# Patient Record
Sex: MALE | Employment: UNEMPLOYED | URBAN - METROPOLITAN AREA
[De-identification: names, ages, dates, MRNs, and addresses within clinical notes are randomized per-mention and may not be internally consistent; named-entity substitution may affect disease eponyms.]

---

## 2024-01-20 ENCOUNTER — HOSPITAL ENCOUNTER (EMERGENCY)
Age: 54
Discharge: HOME | End: 2024-01-20
Attending: EMERGENCY MEDICINE

## 2024-01-20 ENCOUNTER — APPOINTMENT (OUTPATIENT)
Dept: ULTRASOUND IMAGING | Age: 54
End: 2024-01-20

## 2024-01-20 VITALS
RESPIRATION RATE: 18 BRPM | SYSTOLIC BLOOD PRESSURE: 192 MMHG | TEMPERATURE: 98.7 F | DIASTOLIC BLOOD PRESSURE: 106 MMHG | HEIGHT: 67 IN | WEIGHT: 203 LBS | BODY MASS INDEX: 31.86 KG/M2 | HEART RATE: 88 BPM | OXYGEN SATURATION: 97 %

## 2024-01-20 DIAGNOSIS — N45.3 EPIDIDYMOORCHITIS: ICD-10-CM

## 2024-01-20 DIAGNOSIS — N50.812 TESTICULAR PAIN, LEFT: Primary | ICD-10-CM

## 2024-01-20 DIAGNOSIS — R03.0 ELEVATED BLOOD PRESSURE READING: ICD-10-CM

## 2024-01-20 LAB
BACTERIA URNS QL MICRO: ABNORMAL /HPF
BILIRUB UR QL STRIP.AUTO: NEGATIVE
CLARITY UR: ABNORMAL
COLOR UR: YELLOW
EPI CELLS #/AREA URNS HPF: ABNORMAL /HPF (ref 0–5)
GLUCOSE UR STRIP.AUTO-MCNC: NEGATIVE MG/DL
HGB UR QL STRIP.AUTO: ABNORMAL
KETONES UR STRIP.AUTO-MCNC: NEGATIVE MG/DL
LEUKOCYTE ESTERASE UR QL STRIP.AUTO: ABNORMAL
NITRITE UR QL STRIP.AUTO: NEGATIVE
PH UR STRIP.AUTO: 6.5 [PH] (ref 5–8)
PROT UR STRIP.AUTO-MCNC: ABNORMAL MG/DL
RBC #/AREA URNS HPF: ABNORMAL /HPF (ref 0–4)
SP GR UR STRIP.AUTO: 1.02 (ref 1–1.03)
UA COMPLETE W REFLEX CULTURE PNL UR: YES
UA DIPSTICK W REFLEX MICRO PNL UR: YES
URN SPEC COLLECT METH UR: ABNORMAL
UROBILINOGEN UR STRIP-ACNC: 0.2 E.U./DL
WBC #/AREA URNS HPF: >100 /HPF (ref 0–5)

## 2024-01-20 PROCEDURE — 6370000000 HC RX 637 (ALT 250 FOR IP): Performed by: EMERGENCY MEDICINE

## 2024-01-20 PROCEDURE — 81001 URINALYSIS AUTO W/SCOPE: CPT

## 2024-01-20 PROCEDURE — 87491 CHLMYD TRACH DNA AMP PROBE: CPT

## 2024-01-20 PROCEDURE — 76870 US EXAM SCROTUM: CPT

## 2024-01-20 PROCEDURE — 99283 EMERGENCY DEPT VISIT LOW MDM: CPT

## 2024-01-20 PROCEDURE — 87591 N.GONORRHOEAE DNA AMP PROB: CPT

## 2024-01-20 PROCEDURE — 87086 URINE CULTURE/COLONY COUNT: CPT

## 2024-01-20 RX ORDER — LEVOFLOXACIN 500 MG/1
500 TABLET, FILM COATED ORAL ONCE
Status: COMPLETED | OUTPATIENT
Start: 2024-01-20 | End: 2024-01-20

## 2024-01-20 RX ORDER — LEVOFLOXACIN 500 MG/1
500 TABLET, FILM COATED ORAL DAILY
Qty: 10 TABLET | Refills: 0 | Status: SHIPPED | OUTPATIENT
Start: 2024-01-20 | End: 2024-01-30

## 2024-01-20 RX ORDER — IBUPROFEN 600 MG/1
600 TABLET ORAL ONCE
Status: COMPLETED | OUTPATIENT
Start: 2024-01-20 | End: 2024-01-20

## 2024-01-20 RX ADMIN — IBUPROFEN 600 MG: 600 TABLET, FILM COATED ORAL at 17:47

## 2024-01-20 RX ADMIN — LEVOFLOXACIN 500 MG: 500 TABLET, FILM COATED ORAL at 17:47

## 2024-01-20 ASSESSMENT — PAIN DESCRIPTION - DESCRIPTORS
DESCRIPTORS: DISCOMFORT
DESCRIPTORS: DISCOMFORT

## 2024-01-20 ASSESSMENT — PAIN DESCRIPTION - PAIN TYPE: TYPE: ACUTE PAIN

## 2024-01-20 ASSESSMENT — PAIN - FUNCTIONAL ASSESSMENT: PAIN_FUNCTIONAL_ASSESSMENT: 0-10

## 2024-01-20 ASSESSMENT — ENCOUNTER SYMPTOMS
SHORTNESS OF BREATH: 0
BACK PAIN: 0
COUGH: 0
RHINORRHEA: 0
EYE PAIN: 0
NAUSEA: 0
DIARRHEA: 0
CONSTIPATION: 0
ABDOMINAL PAIN: 0
VOMITING: 0
EYE REDNESS: 0

## 2024-01-20 ASSESSMENT — PAIN DESCRIPTION - LOCATION
LOCATION: GROIN
LOCATION: GROIN

## 2024-01-20 ASSESSMENT — PAIN DESCRIPTION - FREQUENCY: FREQUENCY: CONTINUOUS

## 2024-01-20 ASSESSMENT — PAIN DESCRIPTION - ONSET: ONSET: GRADUAL

## 2024-01-20 ASSESSMENT — PAIN DESCRIPTION - ORIENTATION
ORIENTATION: LEFT
ORIENTATION: LEFT

## 2024-01-20 ASSESSMENT — LIFESTYLE VARIABLES
HOW OFTEN DO YOU HAVE A DRINK CONTAINING ALCOHOL: MONTHLY OR LESS
HOW MANY STANDARD DRINKS CONTAINING ALCOHOL DO YOU HAVE ON A TYPICAL DAY: 1 OR 2

## 2024-01-20 ASSESSMENT — PAIN SCALES - GENERAL
PAINLEVEL_OUTOF10: 3
PAINLEVEL_OUTOF10: 4

## 2024-01-20 NOTE — ED PROVIDER NOTES
[facility] Emergency Department ED  EMERGENCY DEPARTMENT ENCOUNTER        Pt Name: Yusef Lange  MRN: [id number]  Birthdate 1970  Date of evaluation: 1/20/2024  Provider: Brenda Gregg DO  PCP: No primary care provider on file.  Note Started: 6:11 PM EST 1/20/24    CHIEF COMPLAINT     Groin pain  HISTORY OF PRESENT ILLNESS: 1 or more Elements     Chief Complaint   Patient presents with    Groin Pain     Per pt groin pain since Tuesday no injury noted. Pt stated some burning with urination. Swelling to left testicle per pt.     History from : Patient and referring Physician  Limitations to history : None    Yusef Lange is a 53 y.o. male who presents to the emergency department secondary to concern for left groin pain which radiates to his left testicle.  It has been going on since last Tuesday.  Patient was seen at [facility] emergency department and at that time, it was determined that patient has a urinary tract infection as well as possible orchitis/epididymitis.  I discussed the case with the transferring physician, Dr. Diana, who explains that patient has a tender and swollen left testicle, however intact cremasteric reflexes.  He was transferred to my facility to obtain a scrotal ultrasound to rule out testicular torsion.    Past medical history noted below. Aside from what is stated above denies any other symptoms or modifying factors.     Nursing Notes were all reviewed and agreed with or any disagreements addressed in HPI/MDM.  REVIEW OF SYSTEMS :    Review of Systems   Constitutional:  Negative for chills and fever.   HENT:  Negative for congestion and rhinorrhea.    Eyes:  Negative for pain and redness.   Respiratory:  Negative for cough and shortness of breath.    Cardiovascular:  Negative for chest pain and leg swelling.   Gastrointestinal:  Negative for abdominal pain, constipation, diarrhea, nausea and vomiting.   Genitourinary:  Positive for scrotal swelling and testicular pain. Negative  192/106,Temp: 98.7 °F (37.1 °C), Pulse: 88, Respirations: 18, SpO2: 97 %     Is this patient to be included in the SEP-1 Core Measure due to severe sepsis or septic shock?   No   Exclusion criteria - the patient is NOT to be included for SEP-1 Core Measure due to:  2+ SIRS criteria are not met  CC/HPI Summary, DDx, ED Course, and Reassessment:   Yusef Lange is a 53 y.o. male who presents to the emergency department secondary to concern for symptoms as noted in HPI above.     On presentation he is in no acute distress. Ultrasound of scrotum obtained, however patient left the emergency department prior to completion of radiology impression of scrotal ultrasound.  I was thus unable to provide patient with an official diagnosis or proper discharge section.  However, given that he has symptoms of orchitis/epididymitis as well as urinary tract infection, I did provide a prescription for Levaquin to his pharmacy.    8:18 PM official radiology impression submitted which is negative for testicular torsion, but does show left epididymo-orchitis with small left hydrocele that is likely reactive. Patient called and notified of results and advised to  prescription at pharmacy.    I am the Primary Clinician of Record.  FINAL IMPRESSION      1. Testicular pain, left    2. Elevated blood pressure reading          DISPOSITION/PLAN   DISPOSITION Eloped - Left Before Treatment Complete 01/20/2024 07:51:03 PM      PATIENT REFERRED TO:  No follow-up provider specified.    DISCHARGE MEDICATIONS:  Discharge Medication List as of 1/20/2024  7:56 PM        START taking these medications    Details   levoFLOXacin (LEVAQUIN) 500 MG tablet Take 1 tablet by mouth daily for 10 days, Disp-10 tablet, R-0Normal                (Please note that portions of this note were completed with a voice recognition program.  Efforts were made to edit the dictations but occasionally words are mis-transcribed.)    Brenda Gregg DO (electronically

## 2024-01-20 NOTE — ED PROVIDER NOTES
[facility] ED  EMERGENCY DEPARTMENT ENCOUNTER      Pt Name: Yusef Lange  MRN: [id number]  Birthdate 1970  Date of evaluation: 1/20/2024  Provider: Nimesh Diana MD    CHIEF COMPLAINT       Chief Complaint   Patient presents with    Groin Pain     Per pt groin pain since Tuesday no injury noted. Pt stated some burning with urination. Swelling to left testicle per pt.         HISTORY OF PRESENT ILLNESS   (Location/Symptom, Timing/Onset, Context/Setting, Quality, Duration, Modifying Factors, Severity)  Note limiting factors.   Yusef Lange is a 53 y.o. male who presents to the emergency department with the chief complaint of   Chief Complaint   Patient presents with    Groin Pain     Per pt groin pain since Tuesday no injury noted. Pt stated some burning with urination. Swelling to left testicle per pt.   . 53-year-old male with no significant past medical history presents ER for evaluation of left testicular pain and swelling.  Patient states symptoms started on Tuesday.  Patient states initially noted enlarged testicle which became aching in sensation.  He states palpation or movement makes pain worse.  Patient also describes transient burning with urination after the event.  Patient denies any recent trauma but states has been climbing up and down straddling the apex of his roof.  Patient denies fevers, chills, flank pain, nausea or vomiting.  Patient denies penile discharge.  Patient states he is in a monogamous relationship with his wife.        Nursing Notes were reviewed.    REVIEW OF SYSTEMS    (2-9 systems for level 4, 10 or more for level 5)     Review of Systems   All other systems reviewed and are negative.      Except as noted above the remainder of the review of systems was reviewed and negative.       PAST MEDICAL HISTORY   History reviewed. No pertinent past medical history.      SURGICAL HISTORY     History reviewed. No pertinent surgical history.      CURRENT MEDICATIONS     TempSrc: Oral   SpO2: 97%   Weight: 92.1 kg (203 lb)   Height: 1.702 m (5' 7\")     Patient was given the following medications:  Medications   levoFLOXacin (LEVAQUIN) tablet 500 mg (500 mg Oral Given 1/20/24 1747)   ibuprofen (ADVIL;MOTRIN) tablet 600 mg (600 mg Oral Given 1/20/24 1747)         Patient was reassessed multiple times while in the emergency department patient with transient improvement of symptoms at time of transfer    [unfilled]            I am the Primary Clinician of Record.    MDM  Number of Diagnoses or Management Options  Elevated blood pressure reading  Testicular pain, left  Diagnosis management comments: Patient presentation in keeping with epididymo-orchitis.  Less likely torsion.  Unable to completely rule torsion out.  Will obtain ultrasound to definitively diagnose patient.  Will initiate empiric prophylaxis and treatment.  Will send off urinalysis as well as GC and chlamydia.  Low likelihood patient has STD.    Spoke with ED attending at [place].  Informed the patient presentation and concern for epididymal orchitis less likely torsion.          CRITICAL CARE TIME   PROCEDURES:  Unless otherwise noted below, none     Procedures    FINAL IMPRESSION      1. Testicular pain, left    2. Elevated blood pressure reading    3. Epididymoorchitis          DISPOSITION/PLAN   DISPOSITION Eloped - Left Before Treatment Complete 01/20/2024 07:51:03 PM      PATIENT REFERRED TO:  No follow-up provider specified.    DISCHARGE MEDICATIONS:  Discharge Medication List as of 1/20/2024  7:56 PM        START taking these medications    Details   levoFLOXacin (LEVAQUIN) 500 MG tablet Take 1 tablet by mouth daily for 10 days, Disp-10 tablet, R-0Normal           Controlled Substances Monitoring:          No data to display                (Please note that portions of this note were completed with a voice recognition program.  Efforts were made to edit the dictations but occasionally words are

## 2024-01-20 NOTE — ED NOTES
Report given to [facility] [place] [place] [facility] for pt transfer care. Pt going POV with son driving him. Pt advised to go directly there for ultra sound.

## 2024-01-21 NOTE — ED NOTES
Pt states he's unable to tolerate ultrasound jelly on his testicles so he was leaving.  ED provider notified.

## 2024-01-22 LAB
BACTERIA UR CULT: NORMAL
C TRACH DNA UR QL NAA+PROBE: NEGATIVE
N GONORRHOEA DNA UR QL NAA+PROBE: NEGATIVE

## 2025-08-20 ENCOUNTER — OFFICE VISIT (OUTPATIENT)
Dept: FAMILY MEDICINE CLINIC | Age: 55
End: 2025-08-20

## 2025-08-20 VITALS
BODY MASS INDEX: 30.76 KG/M2 | SYSTOLIC BLOOD PRESSURE: 157 MMHG | DIASTOLIC BLOOD PRESSURE: 98 MMHG | WEIGHT: 196 LBS | HEIGHT: 67 IN | HEART RATE: 74 BPM

## 2025-08-20 DIAGNOSIS — H54.7 IMPAIRED VISION: ICD-10-CM

## 2025-08-20 DIAGNOSIS — I61.9 HEMORRHAGIC STROKE (HCC): ICD-10-CM

## 2025-08-20 DIAGNOSIS — I10 PRIMARY HYPERTENSION: ICD-10-CM

## 2025-08-20 DIAGNOSIS — Z76.89 ENCOUNTER TO ESTABLISH CARE: Primary | ICD-10-CM

## 2025-08-20 PROCEDURE — 3080F DIAST BP >= 90 MM HG: CPT

## 2025-08-20 PROCEDURE — 3077F SYST BP >= 140 MM HG: CPT

## 2025-08-20 PROCEDURE — 99204 OFFICE O/P NEW MOD 45 MIN: CPT

## 2025-08-20 RX ORDER — LEVETIRACETAM 1000 MG/1
1000 TABLET ORAL 2 TIMES DAILY
COMMUNITY
Start: 2025-08-11 | End: 2025-08-24

## 2025-08-20 RX ORDER — VALSARTAN 80 MG/1
80 TABLET ORAL DAILY
COMMUNITY
Start: 2025-08-14

## 2025-08-20 RX ORDER — CARVEDILOL 12.5 MG/1
12.5 TABLET ORAL 2 TIMES DAILY
COMMUNITY
Start: 2025-08-13

## 2025-08-20 SDOH — ECONOMIC STABILITY: FOOD INSECURITY: WITHIN THE PAST 12 MONTHS, YOU WORRIED THAT YOUR FOOD WOULD RUN OUT BEFORE YOU GOT MONEY TO BUY MORE.: NEVER TRUE

## 2025-08-20 SDOH — ECONOMIC STABILITY: FOOD INSECURITY: WITHIN THE PAST 12 MONTHS, THE FOOD YOU BOUGHT JUST DIDN'T LAST AND YOU DIDN'T HAVE MONEY TO GET MORE.: NEVER TRUE

## 2025-08-20 ASSESSMENT — PATIENT HEALTH QUESTIONNAIRE - PHQ9
SUM OF ALL RESPONSES TO PHQ QUESTIONS 1-9: 0
2. FEELING DOWN, DEPRESSED OR HOPELESS: NOT AT ALL
SUM OF ALL RESPONSES TO PHQ QUESTIONS 1-9: 0
1. LITTLE INTEREST OR PLEASURE IN DOING THINGS: NOT AT ALL
SUM OF ALL RESPONSES TO PHQ QUESTIONS 1-9: 0
SUM OF ALL RESPONSES TO PHQ QUESTIONS 1-9: 0

## 2025-08-20 ASSESSMENT — ENCOUNTER SYMPTOMS
RESPIRATORY NEGATIVE: 1
GASTROINTESTINAL NEGATIVE: 1